# Patient Record
Sex: FEMALE | Race: WHITE | NOT HISPANIC OR LATINO | URBAN - METROPOLITAN AREA
[De-identification: names, ages, dates, MRNs, and addresses within clinical notes are randomized per-mention and may not be internally consistent; named-entity substitution may affect disease eponyms.]

---

## 2021-02-05 ENCOUNTER — APPOINTMENT (RX ONLY)
Dept: URBAN - METROPOLITAN AREA CLINIC 352 | Facility: CLINIC | Age: 26
Setting detail: DERMATOLOGY
End: 2021-02-05

## 2021-02-05 PROBLEM — D23.72 OTHER BENIGN NEOPLASM OF SKIN OF LEFT LOWER LIMB, INCLUDING HIP: Status: ACTIVE | Noted: 2021-02-05

## 2021-02-05 PROCEDURE — ? COUNSELING

## 2021-02-05 PROCEDURE — 99202 OFFICE O/P NEW SF 15 MIN: CPT

## 2021-02-05 PROCEDURE — ? ADDITIONAL NOTES

## 2021-02-05 NOTE — PROCEDURE: ADDITIONAL NOTES
Detail Level: Zone
Render Risk Assessment In Note?: no
Additional Notes: Recommended removal with biopsy by punch method and discusses risk of scarring from procedure